# Patient Record
Sex: FEMALE | URBAN - METROPOLITAN AREA
[De-identification: names, ages, dates, MRNs, and addresses within clinical notes are randomized per-mention and may not be internally consistent; named-entity substitution may affect disease eponyms.]

---

## 2018-02-09 ENCOUNTER — TELEPHONE (OUTPATIENT)
Dept: CARDIOLOGY | Facility: CLINIC | Age: 83
End: 2018-02-09

## 2018-02-09 NOTE — TELEPHONE ENCOUNTER
Patient referred to Dr. Alegria for SOB possible MR/clip.  Received faxed records.  Spoke with patient.  Will need to have disc of C and CCFD mailed to Dr. Alegria.  Provided mailing address to patient.  She will contact her Cardiologist's office to have sent.

## 2018-04-10 ENCOUNTER — TELEPHONE (OUTPATIENT)
Dept: CARDIOLOGY | Facility: CLINIC | Age: 83
End: 2018-04-10

## 2018-04-10 NOTE — TELEPHONE ENCOUNTER
Dr Alegria has reviewed the coronary angiogram which showed normal coronaries and the CCFD.  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  Annalee in his office was also notified.